# Patient Record
Sex: FEMALE | Race: WHITE | NOT HISPANIC OR LATINO | URBAN - METROPOLITAN AREA
[De-identification: names, ages, dates, MRNs, and addresses within clinical notes are randomized per-mention and may not be internally consistent; named-entity substitution may affect disease eponyms.]

---

## 2017-03-20 ENCOUNTER — IMPORTED ENCOUNTER (OUTPATIENT)
Dept: URBAN - METROPOLITAN AREA CLINIC 1 | Facility: CLINIC | Age: 82
End: 2017-03-20

## 2017-03-20 PROBLEM — H02.834: Noted: 2017-03-20

## 2017-03-20 PROBLEM — H02.831: Noted: 2017-03-20

## 2017-03-20 PROBLEM — H25.813: Noted: 2017-03-20

## 2017-03-20 PROBLEM — H16.143: Noted: 2017-03-20

## 2017-03-20 PROBLEM — H40.1112: Noted: 2017-03-20

## 2017-03-20 PROBLEM — H40.1121: Noted: 2017-03-20

## 2017-03-20 PROBLEM — H04.123: Noted: 2017-03-20

## 2017-03-20 PROCEDURE — 92083 EXTENDED VISUAL FIELD XM: CPT

## 2017-03-20 PROCEDURE — 92014 COMPRE OPH EXAM EST PT 1/>: CPT

## 2017-03-20 PROCEDURE — 92015 DETERMINE REFRACTIVE STATE: CPT

## 2017-03-20 NOTE — PATIENT DISCUSSION
1.  Cataract OU:  Visually Significant secondary to glare discussed the risks benefits alternatives and limitations of cataract surgery. The patient stated a full understanding and a desire to proceed with the procedure. The patient will need to return for preop appointment with cataract measurements and to have any additional questions answered and start pre-operative eye drops as directed. Phaco PCL OS then OD. Otherwise follow-up in 6 months for a dfe/OCT/glare in 6 months2. 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 Suoerior arcuate noted on HVF OD. Normal HVF OS. Patient to continue with Lumigan OU QHS. Patient advised to be compliant with gtts. Condition was discussed with patient and patient understands. Will continue to monitor patient for any progression in condition. Patient was advised to call us with any problems questions or concerns. 3.  RASTA w/ PEK OU- Stable. The continuation of artificial tears were recommended. 4.  Dermatochalasis OU UL's- Follow with no intervention at this time. 5. Return for an appointment for birgit H&P with Dr. Rob Dunlap.

## 2017-05-30 ENCOUNTER — IMPORTED ENCOUNTER (OUTPATIENT)
Dept: URBAN - METROPOLITAN AREA CLINIC 1 | Facility: CLINIC | Age: 82
End: 2017-05-30

## 2017-05-30 PROBLEM — H25.813: Noted: 2017-05-30

## 2017-05-30 PROCEDURE — 92136 OPHTHALMIC BIOMETRY: CPT

## 2017-05-30 NOTE — PATIENT DISCUSSION
1. Cataract OU:  Visually Significant secondary to glare discussed the risks benefits alternatives and limitations of cataract surgery. The patient stated a full understanding and a desire to proceed with the procedure. Pt understands they will need glasses post-op to achieve their best vision at distance and near. Pt states she only wants what insurance covers. Phaco PCL OS then OD. Standard lens standard technique.

## 2017-06-07 ENCOUNTER — IMPORTED ENCOUNTER (OUTPATIENT)
Dept: URBAN - METROPOLITAN AREA CLINIC 1 | Facility: CLINIC | Age: 82
End: 2017-06-07

## 2017-06-08 ENCOUNTER — IMPORTED ENCOUNTER (OUTPATIENT)
Dept: URBAN - METROPOLITAN AREA CLINIC 1 | Facility: CLINIC | Age: 82
End: 2017-06-08

## 2017-06-08 PROBLEM — Z96.1: Noted: 2017-06-08

## 2017-06-08 PROCEDURE — 99024 POSTOP FOLLOW-UP VISIT: CPT

## 2017-06-08 NOTE — PATIENT DISCUSSION
POD#1 CE/IOL OS (Standard)  doing well. Continue all 3 gtts as prescribed and until gone. Use Lotemax BID OS Prolensa Qdaily OS Ocuflox TID OS Use Lumigan QHS OU Post op Warnings Reiterated RTC as scheduled

## 2017-06-16 ENCOUNTER — IMPORTED ENCOUNTER (OUTPATIENT)
Dept: URBAN - METROPOLITAN AREA CLINIC 1 | Facility: CLINIC | Age: 82
End: 2017-06-16

## 2017-06-16 PROBLEM — Z96.1: Noted: 2017-06-16

## 2017-06-16 PROBLEM — H25.811: Noted: 2017-06-16

## 2017-06-16 PROCEDURE — 92136 OPHTHALMIC BIOMETRY: CPT

## 2017-06-16 NOTE — PATIENT DISCUSSION
1.  Cataract OD: Visually Significant secondary to glare discussed the risks benefits alternatives and limitations of cataract surgery. The patient stated a full understanding and a desire to proceed with the procedure. The patient will need to start pre-operative eye drops as directed. Phaco PCL OD Pt understands they will need glasses post-op to achieve their best corrected vision. 2.  POW#1  CE/IOL Standard OS doing well. Discontinue OcufloxContinue Lotemax/Durezol/Prednisolone BID until gone. Continue Prolensa/Ilevro/Acular QD until gone. 3.   Return for sx OD as scheduled w/ [facility]

## 2017-06-28 ENCOUNTER — IMPORTED ENCOUNTER (OUTPATIENT)
Dept: URBAN - METROPOLITAN AREA CLINIC 1 | Facility: CLINIC | Age: 82
End: 2017-06-28

## 2017-06-29 ENCOUNTER — IMPORTED ENCOUNTER (OUTPATIENT)
Dept: URBAN - METROPOLITAN AREA CLINIC 1 | Facility: CLINIC | Age: 82
End: 2017-06-29

## 2017-06-29 PROBLEM — Z96.1: Noted: 2017-06-29

## 2017-06-29 PROCEDURE — 99024 POSTOP FOLLOW-UP VISIT: CPT

## 2017-06-29 NOTE — PATIENT DISCUSSION
1. POD#1 CE/IOL OD (Standard)  doing well. Continue all 3 gtts as prescribed and until gone. Use Lotemax BID OD Prolensa Qdaily OD Ocuflox TID OD 2. POW#1  CE/IOL OS (Standard) doing well.   Use Lotemax BID OS till out Use Prolensa Qdaily OS till out F/u as scheduled

## 2017-07-21 ENCOUNTER — IMPORTED ENCOUNTER (OUTPATIENT)
Dept: URBAN - METROPOLITAN AREA CLINIC 1 | Facility: CLINIC | Age: 82
End: 2017-07-21

## 2017-07-21 PROBLEM — Z96.1: Noted: 2017-07-21

## 2017-07-21 PROCEDURE — 99024 POSTOP FOLLOW-UP VISIT: CPT

## 2017-07-21 NOTE — PATIENT DISCUSSION
POM#1 CE/IOL Standard OU doing well. Continue Lotemax BID until gone. Continue Prolensa QD until gone. Continue Lumigan OU QHS. Return for an appointment for a 30/OCT in 3-4 months with Dr. Santiago Lama.

## 2017-11-27 ENCOUNTER — IMPORTED ENCOUNTER (OUTPATIENT)
Dept: URBAN - METROPOLITAN AREA CLINIC 1 | Facility: CLINIC | Age: 82
End: 2017-11-27

## 2017-11-27 PROBLEM — Z96.1: Noted: 2017-11-27

## 2017-11-27 PROBLEM — H40.1112: Noted: 2017-11-27

## 2017-11-27 PROBLEM — H04.123: Noted: 2017-11-27

## 2017-11-27 PROBLEM — H40.1121: Noted: 2017-11-27

## 2017-11-27 PROBLEM — H16.143: Noted: 2017-11-27

## 2017-11-27 PROBLEM — H43.811: Noted: 2017-11-27

## 2017-11-27 PROCEDURE — 92014 COMPRE OPH EXAM EST PT 1/>: CPT

## 2017-11-27 PROCEDURE — 92133 CPTRZD OPH DX IMG PST SGM ON: CPT

## 2017-11-27 NOTE — PATIENT DISCUSSION
1. COAG OU-- Mod OD Mild OS (0.8/0.75)- OCT show no progression OU. Stable IOP OU on Lumigan OU QHS. CPM. H/o PI's OU; Compliance with meds. 2.  RASTA w/ increased PEK OU- Increase ATs to TID OU Routinely. 3.  Pseudophakia OU - (Standard OU) 4. PVD w/o Tear OD - RD precautions. Letter to PCP Return for an appointment in 6 mo 10 VF 24-2 OU with Dr. Yuniel Grey.

## 2018-06-04 ENCOUNTER — IMPORTED ENCOUNTER (OUTPATIENT)
Dept: URBAN - METROPOLITAN AREA CLINIC 1 | Facility: CLINIC | Age: 83
End: 2018-06-04

## 2018-06-04 PROBLEM — H40.1121: Noted: 2018-06-04

## 2018-06-04 PROBLEM — H40.1112: Noted: 2018-06-04

## 2018-06-04 PROBLEM — H02.423: Noted: 2018-06-04

## 2018-06-04 PROBLEM — H16.143: Noted: 2018-06-04

## 2018-06-04 PROBLEM — H04.123: Noted: 2018-06-04

## 2018-06-04 PROBLEM — Z96.1: Noted: 2018-06-04

## 2018-06-04 PROCEDURE — 92012 INTRM OPH EXAM EST PATIENT: CPT

## 2018-06-04 NOTE — PATIENT DISCUSSION
Mild Open Angle Glaucoma OS -Patient to continue with current gtt regimen. Patient advised to be compliant with gtts. Condition was discussed with patient and patient understands. Will continue to monitor patient for any progression in condition. Patient was advised to call us with any problems questions or concerns.

## 2018-06-04 NOTE — PATIENT DISCUSSION
1.  Moderate Open Angle Glaucoma OD (0.8)- Stable IOP. HVF defect OD w/ slight progression. Patient to continue with Lumigan OU QHS. Patient advised to be compliant with gtts. Condition was discussed with patient and patient understands. Will continue to monitor patient for any progression in condition. Patient was advised to call us with any problems questions or concerns. 2.  Mild Open Angle Glaucoma OS (0.75)- Stable IOP. Normal HVF OS. Patient to continue with Lumigan OU QHS. Patient advised to be compliant with gtts. Condition was discussed with patient and patient understands. Will continue to monitor patient for any progression in condition. Patient was advised to call us with any problems questions or concerns. 3.  RASTA w/ PEK OU- Much improved. The continuation of artificial tears were recommended. 4.  Myogenic Ptosis OU- Will continue to observe at this time. 5.  Pseudophakia OU- Doing well. 6.  Return for an appointment for a 30/OCT in 6 months with Dr. Omero Grey.

## 2018-12-03 ENCOUNTER — IMPORTED ENCOUNTER (OUTPATIENT)
Dept: URBAN - METROPOLITAN AREA CLINIC 1 | Facility: CLINIC | Age: 83
End: 2018-12-03

## 2018-12-03 PROBLEM — H04.123: Noted: 2018-12-03

## 2018-12-03 PROBLEM — H40.1112: Noted: 2018-12-03

## 2018-12-03 PROBLEM — H40.1121: Noted: 2018-12-03

## 2018-12-03 PROBLEM — H16.143: Noted: 2018-12-03

## 2018-12-03 PROCEDURE — 92014 COMPRE OPH EXAM EST PT 1/>: CPT

## 2018-12-03 PROCEDURE — 92133 CPTRZD OPH DX IMG PST SGM ON: CPT

## 2018-12-03 NOTE — PATIENT DISCUSSION
1.  Moderate Open Angle Glaucoma OD (CD 0.8) OCT shows no progression OU. IOP stable OU. Cont Lumigan QHS OU. Compliance with meds. 2.  Mild Open Angle Glaucoma OS (CD 0.75) OCT shows no progression OU. IOP stable OU. Cont Lumigan QHS OU. Compliance with meds. 3.  RASTA w/ PEK OU- Increase ATs TID OU Routinely. (Samples of ATs x2 given) 3. Pseudophakia OU - (Standard OU) 4. PVD w/o Tear OD - RD precautions. Letter to Felicia Buchanan for an appointment in 6 mo 10 VF 24-2 OU with Dr. Nicole Ozuna.

## 2019-06-03 ENCOUNTER — IMPORTED ENCOUNTER (OUTPATIENT)
Dept: URBAN - METROPOLITAN AREA CLINIC 1 | Facility: CLINIC | Age: 84
End: 2019-06-03

## 2019-06-03 PROBLEM — H40.1121: Noted: 2019-06-03

## 2019-06-03 PROBLEM — H40.1112: Noted: 2019-06-03

## 2019-06-03 PROCEDURE — 92083 EXTENDED VISUAL FIELD XM: CPT

## 2019-06-03 PROCEDURE — 92012 INTRM OPH EXAM EST PATIENT: CPT

## 2019-06-03 NOTE — PATIENT DISCUSSION
1.  Moderate Open Angle Glaucoma OD (CD 0.8) VF shows no progression OU. IOP stable OU. Cont Lumigan QHS OU. Compliance with meds. 2.  Mild Open Angle Glaucoma OS (CD 0.75) VF shows no progression OU. IOP stable OU. Cont Lumigan QHS OU. Compliance with meds. 3.  RASTA w/ PEK OU- Increase ATs TID OU Routinely. (Samples of ATs x2 given) 3. Pseudophakia OU - (Standard OU) 4. PVD w/o Tear OD - RD precautions. Return for an appointment in 6 mo 30 OCT with Dr. Crow Monet.

## 2019-09-19 NOTE — PATIENT DISCUSSION
PLAN: CE/IOL OS THEN OD, goal trudy ou, p.o with [facility]. Candidate for all lens options. Needs van. WANTS BASIC ONLY OU.  Understands she will most likely need glasses for all ranges. No imprimis ou.

## 2019-12-09 ENCOUNTER — IMPORTED ENCOUNTER (OUTPATIENT)
Dept: URBAN - METROPOLITAN AREA CLINIC 1 | Facility: CLINIC | Age: 84
End: 2019-12-09

## 2019-12-09 PROBLEM — H43.811: Noted: 2019-12-09

## 2019-12-09 PROBLEM — H04.123: Noted: 2019-12-09

## 2019-12-09 PROBLEM — H02.834: Noted: 2019-12-09

## 2019-12-09 PROBLEM — H40.1111: Noted: 2019-12-09

## 2019-12-09 PROBLEM — H40.1121: Noted: 2019-12-09

## 2019-12-09 PROBLEM — H02.831: Noted: 2019-12-09

## 2019-12-09 PROBLEM — H16.143: Noted: 2019-12-09

## 2019-12-09 PROBLEM — H40.1112: Noted: 2019-12-09

## 2019-12-09 PROBLEM — Z96.1: Noted: 2019-12-09

## 2019-12-09 PROCEDURE — 92133 CPTRZD OPH DX IMG PST SGM ON: CPT

## 2019-12-09 PROCEDURE — 92014 COMPRE OPH EXAM EST PT 1/>: CPT

## 2019-12-09 NOTE — PATIENT DISCUSSION
1.  Moderate Open Angle Glaucoma OD (CD 0.8) OCT today showing slight progression however signal strength relatively poor. IOP stable OU. Cont Lumigan QHS OU. Compliance with meds. **Add gtt with any future progression2. Mild Open Angle Glaucoma OS (CD 0.75) OCT today showing slight progression however signal strength relatively poor. IOP stable OU. Cont Lumigan QHS OU. Compliance with meds. **Add gtt with any future progression3. RASTA w/ PEK OU -- Cont ATs TID OU Routinely. (Samples of ATs x2 given) 4. Pseudophakia OU - (Standard OU)  5. PVD w/o Tear OD - RD precautions. 6.  Dermatochalasis OU UL's  - Follow with no intervention at this time. 7. S/p PI 2014. Return for an appointment in 6 months for a 10/VF with Dr. Zuly Tate.

## 2019-12-10 NOTE — PATIENT DISCUSSION
ready for IOL. [FreeTextEntry1] : Poor sleep from stress\par ativan 0.5 1 or 2 tab night\par full blood\par stress management

## 2020-06-12 ENCOUNTER — IMPORTED ENCOUNTER (OUTPATIENT)
Dept: URBAN - METROPOLITAN AREA CLINIC 1 | Facility: CLINIC | Age: 85
End: 2020-06-12

## 2020-06-12 PROBLEM — H40.1121: Noted: 2020-06-12

## 2020-06-12 PROBLEM — H40.1112: Noted: 2020-06-12

## 2020-06-12 PROCEDURE — 99213 OFFICE O/P EST LOW 20 MIN: CPT

## 2020-06-12 NOTE — PATIENT DISCUSSION
RASTA w/ PEK OU - Cont ATs TID OU Routinely. 4. Pseudophakia OU - (Standard OU)  5.  Dermatochalasis OU UL's  - Follow with no intervention at this time. 6. H/o PVD ODReturn for an appointment in [place] [address] 30/OCT with Dr. Machelle Salazar.

## 2020-06-12 NOTE — PATIENT DISCUSSION
1.  Moderate Open Angle Glaucoma OD/Mild OS (CD 0.80/0.75) - IOP stable cont Latanoprost QHS OU. **Add gtts with future progression. S/p PI OU (2014). Patient advised to be compliant with gtts. Condition was discussed with patient and patient understands. Will continue to monitor patient for any progression in condition. Patient was advised to call us with any problems questions or concerns. 2.  RASTA w/ PEK OU - Cont ATs TID OU Routinely. 3. Pseudophakia OU - (Standard OU)  4.  Dermatochalasis OU UL's  - Follow with no intervention at this time. 5. H/o PVD ODReturn for an appointment in November 30/OCT with Dr. Zuly Tate.

## 2020-07-16 NOTE — PATIENT DISCUSSION
photo today-disc with pt that we must follow this/refer to a retinal specialist for treatment, no visual issue. History of higher BP.

## 2021-01-04 ENCOUNTER — IMPORTED ENCOUNTER (OUTPATIENT)
Dept: URBAN - METROPOLITAN AREA CLINIC 1 | Facility: CLINIC | Age: 86
End: 2021-01-04

## 2021-01-04 PROBLEM — H16.143: Noted: 2021-01-04

## 2021-01-04 PROBLEM — H40.1112: Noted: 2021-01-04

## 2021-01-04 PROBLEM — H40.1121: Noted: 2021-01-04

## 2021-01-04 PROBLEM — H04.123: Noted: 2021-01-04

## 2021-01-04 PROCEDURE — 92014 COMPRE OPH EXAM EST PT 1/>: CPT

## 2021-01-04 PROCEDURE — 92133 CPTRZD OPH DX IMG PST SGM ON: CPT

## 2021-01-04 NOTE — PATIENT DISCUSSION
1.  Moderate Open Angle Glaucoma OD/Mild OS (CD 0.80/0.75) - No progression by OCT OU. IOP stable cont Latanoprost QHS OU. **Add gtts with future progression. S/p PI OU (2014). Patient advised to be compliant with gtts. Condition was discussed with patient and patient understands. Will continue to monitor patient for any progression in condition. Patient was advised to call us with any problems questions or concerns. 2.  RASTA w/ PEK OU -- Recommend ATs TID OU Routinely3. Pseudophakia OU - (Standard OU)  4. PVD w/o Tear OD - Stable. RD precautions. 5.  Dermatochalasis OU UL's  - Follow with no intervention at this time. Patient deferred Manifest Rx today. Return for an appointment in 6 months 10 with Dr. Milly Shaw.

## 2021-04-21 NOTE — PATIENT DISCUSSION
The patient feels that the cataract is significantly impacting daily activities and has elected cataract surgery. The risks, benefits, and alternatives to surgery were discussed. The patient elects to proceed with surgery. Labs/Imaging Studies/Medications

## 2021-07-12 ENCOUNTER — IMPORTED ENCOUNTER (OUTPATIENT)
Dept: URBAN - METROPOLITAN AREA CLINIC 1 | Facility: CLINIC | Age: 86
End: 2021-07-12

## 2021-07-12 PROBLEM — H40.1121: Noted: 2021-07-12

## 2021-07-12 PROBLEM — H40.1112: Noted: 2021-07-12

## 2021-07-12 PROBLEM — H04.123: Noted: 2021-07-12

## 2021-07-12 PROBLEM — H16.143: Noted: 2021-07-12

## 2021-07-12 PROCEDURE — 92083 EXTENDED VISUAL FIELD XM: CPT

## 2021-07-12 PROCEDURE — 99213 OFFICE O/P EST LOW 20 MIN: CPT

## 2021-07-12 NOTE — PATIENT DISCUSSION
1.  Moderate Open Angle Glaucoma OD/ Mild OS - VF stable. Patient to continue with current gtt regimen(Latanoprost OU hs). Patient advised to be compliant with gtts. Condition was discussed with patient and patient understands. Will continue to monitor patient for any progression in condition. Patient was advised to call us with any problems questions or concerns. 2.  RASTA w/ PEK OU-The use/continuation of artificial tears were recommended. 3.  Return for an appointment in 6 months for 30 OCT with Dr. Yue Gant.

## 2021-11-15 NOTE — PROCEDURE NOTE: SURGICAL
"<span style=""font-weight:bold;""></span><span style=""font-weight:bold;"">MR #:&nbsp;</span>&nbsp;[id number]<br /><br /><span style=""font-weight:bold;"">PREOPERATIVE DIAGNOSIS:</span>&nbsp;Dermatochalasis upper lids<br /><br /><span style=""font-weight:bold;"">POSTOPERATIVE DIAGNOSIS:</span>&nbsp;Same<br /><br /><span style=""font-weight:bold;"">OPERATIVE PROCEDURE:</span>&nbsp; Upper lid blepharoplasty both eyes<br /><br /><span style=""font-weight:bold;"">ANESTHESIA: &nbsp;</span>&nbsp;&nbsp;&nbsp; Local MAC<br /><br /><span style=""font-weight:bold;"">ESTIMATED BLOOD LOSS:</span>&nbsp;&nbsp;Minimal

## 2021-11-22 NOTE — PATIENT DISCUSSION
One week post op: great curve and shape, no infection, healing well, sutures intact and removed, use erythromycin TID x 1 weeks , then QHS to upper eyelids x 7-10 days. Wear sunglasses and hat while outdoors. Avoid sun exposure. No restrictions in 5 days.

## 2022-01-24 ENCOUNTER — IMPORTED ENCOUNTER (OUTPATIENT)
Dept: URBAN - METROPOLITAN AREA CLINIC 1 | Facility: CLINIC | Age: 87
End: 2022-01-24

## 2022-01-24 PROBLEM — H04.123: Noted: 2022-01-24

## 2022-01-24 PROBLEM — Z96.1: Noted: 2022-01-24

## 2022-01-24 PROBLEM — H40.1121: Noted: 2022-01-24

## 2022-01-24 PROBLEM — H40.1112: Noted: 2022-01-24

## 2022-01-24 PROBLEM — H16.143: Noted: 2022-01-24

## 2022-01-24 PROBLEM — H43.811: Noted: 2022-01-24

## 2022-01-24 PROCEDURE — 99214 OFFICE O/P EST MOD 30 MIN: CPT

## 2022-01-24 PROCEDURE — 92133 CPTRZD OPH DX IMG PST SGM ON: CPT

## 2022-01-24 NOTE — PATIENT DISCUSSION
1.  Moderate Open Angle Glaucoma OD/Mild OS -- (CD 0.80/0.75) No progression by OCT OU. IOP stable cont Latanoprost QHS OU. **Add gtts with future progression. S/p PI OU (2014). Patient advised to be compliant with gtts. Condition was discussed with patient and patient understands. Will continue to monitor patient for any progression in condition. Patient was advised to call us with any problems questions or concerns. 2.  RASTA w/ PEK OU -- Recommend ATs TID OU Routinely. *urged compliance. 3.  Dermatochalasis OU UL's -- Follow with no intervention at this time. 4. Pseudophakia OU (Standard OU)  5. PVD w/o Tear OD -- Stable. RD precautions. Patient deferred Manifest Rx today. Return for an appointment in 6 months 10/VF with Dr. Claudeen Cobble.

## 2022-04-02 ASSESSMENT — VISUAL ACUITY
OS_CC: 20/50
OS_CC: 20/30
OD_CC: 20/60
OS_CC: 20/25
OD_CC: 20/30-1
OD_CC: 20/25
OS_PH: SC 20/30
OS_CC: 20/60
OS_GLARE: 20/50
OD_SC: 20/25
OD_CC: 20/40
OD_CC: 20/25
OD_SC: 20/20-1
OD_CC: 20/60
OS_CC: 20/30
OD_SC: 20/50-1
OS_CC: 20/30
OD_CC: 20/40
OD_CC: 20/25-2
OS_CC: 20/25-1
OD_CC: J1
OD_CC: 20/30+2
OS_SC: 20/30
OS_SC: 20/20
OS_CC: 20/30
OS_CC: 20/30
OS_CC: 20/50
OD_CC: 20/20
OD_CC: 20/25-2
OS_CC: 20/20-1
OD_CC: 20/30
OD_CC: 20/25-1
OS_SC: 20/25
OS_CC: 20/25
OS_GLARE: 20/50
OD_GLARE: 20/50
OS_CC: 20/40
OD_GLARE: 20/50
OS_CC: J1
OS_CC: 20/25
OS_CC: 20/25

## 2022-04-02 ASSESSMENT — TONOMETRY
OS_IOP_MMHG: 20
OD_IOP_MMHG: 15
OD_IOP_MMHG: 17
OS_IOP_MMHG: 15
OD_IOP_MMHG: 14
OS_IOP_MMHG: 17
OS_IOP_MMHG: 18
OD_IOP_MMHG: 14
OS_IOP_MMHG: 15
OS_IOP_MMHG: 17
OD_IOP_MMHG: 17
OD_IOP_MMHG: 16
OD_IOP_MMHG: 15
OS_IOP_MMHG: 17
OS_IOP_MMHG: 18
OD_IOP_MMHG: 15
OD_IOP_MMHG: 17
OS_IOP_MMHG: 19
OD_IOP_MMHG: 18
OD_IOP_MMHG: 17
OD_IOP_MMHG: 16
OD_IOP_MMHG: 20
OD_IOP_MMHG: 18
OS_IOP_MMHG: 15
OS_IOP_MMHG: 16
OS_IOP_MMHG: 15
OD_IOP_MMHG: 18
OS_IOP_MMHG: 15

## 2022-04-02 ASSESSMENT — KERATOMETRY
OD_AXISANGLE2_DEGREES: 132
OS_AXISANGLE2_DEGREES: 165
OS_K2POWER_DIOPTERS: 46.50
OS_K2POWER_DIOPTERS: 46.50
OS_AXISANGLE2_DEGREES: 096
OD_AXISANGLE_DEGREES: 042
OS_K1POWER_DIOPTERS: 45.25
OS_K1POWER_DIOPTERS: 45.75
OD_AXISANGLE_DEGREES: 148
OD_K1POWER_DIOPTERS: 45.75
OD_K2POWER_DIOPTERS: 46.50
OD_AXISANGLE2_DEGREES: 058
OS_AXISANGLE_DEGREES: 006
OD_K1POWER_DIOPTERS: 46.50
OD_K2POWER_DIOPTERS: 45.00
OS_AXISANGLE_DEGREES: 075

## 2022-09-09 ENCOUNTER — FOLLOW UP (OUTPATIENT)
Dept: URBAN - METROPOLITAN AREA CLINIC 1 | Facility: CLINIC | Age: 87
End: 2022-09-09

## 2022-09-09 DIAGNOSIS — H40.1121: ICD-10-CM

## 2022-09-09 DIAGNOSIS — H40.1112: ICD-10-CM

## 2022-09-09 PROCEDURE — 99213 OFFICE O/P EST LOW 20 MIN: CPT

## 2022-09-09 PROCEDURE — 92083 EXTENDED VISUAL FIELD XM: CPT

## 2022-09-09 ASSESSMENT — VISUAL ACUITY
OD_SC: 20/50
OS_CC: 20/20
OS_SC: 20/30
OD_CC: 20/30

## 2022-09-09 ASSESSMENT — TONOMETRY
OS_IOP_MMHG: 14
OD_IOP_MMHG: 14

## 2022-09-09 NOTE — PATIENT DISCUSSION
(CD 0.75 OS) HVF shows early inferior arcuate OS. IOP stable. Continue Latanoprost QHS OU. **Add gtts with future progression. S/p PI OU (2014). Patient advised to be compliant with gtts. Condition was discussed with patient and patient understands. Will continue to monitor patient for any progression in condition. Patient was advised to call us with any problems questions or concerns.

## 2022-09-09 NOTE — PATIENT DISCUSSION
(CD 0.80 OD) HVF shows superior arcuate with paracentral defect with progression. IOP stable, however given progression on HVF will begin Brimonidine BID OD Only. Continue Latanoprost QHS OU. S/p PI OU (2014). Patient advised to be compliant with gtts. Condition was discussed with patient and patient understands. Will continue to monitor patient for any progression in condition. Patient was advised to call us with any problems questions or concerns.

## 2022-09-09 NOTE — PATIENT DISCUSSION
Doing well. pt feeling better; SG high of urine; have spoken to pt about elevated urobilinogen and followup; no stigmata of liver disease or hemolysis

## 2022-12-09 ENCOUNTER — FOLLOW UP (OUTPATIENT)
Dept: URBAN - METROPOLITAN AREA CLINIC 1 | Facility: CLINIC | Age: 87
End: 2022-12-09

## 2022-12-09 PROCEDURE — 99213 OFFICE O/P EST LOW 20 MIN: CPT

## 2022-12-09 ASSESSMENT — VISUAL ACUITY
OD_SC: 20/30-2
OS_SC: 20/30
OD_CC: J1+
OS_CC: J1+

## 2022-12-09 ASSESSMENT — TONOMETRY
OS_IOP_MMHG: 14
OD_IOP_MMHG: 13
OD_IOP_MMHG: 15
OS_IOP_MMHG: 15

## 2022-12-09 NOTE — PATIENT DISCUSSION
(CD 0.75 OS) IOP stable. Continue Latanoprost QHS OU. **Add gtts with future progression. S/p PI OU (2014). Patient advised to be compliant with gtts. Condition was discussed with patient and patient understands. Will continue to monitor patient for any progression in condition. Patient was advised to call us with any problems questions or concerns.

## 2022-12-09 NOTE — PATIENT DISCUSSION
(CD 0.80 OD) IOP stable. Continue Brimonidine BID OD Only. Continue Latanoprost QHS OU. S/p PI OU (2014). Patient advised to be compliant with gtts. Condition was discussed with patient and patient understands. Will continue to monitor patient for any progression in condition. Patient was advised to call us with any problems questions or concerns.

## 2023-06-13 ENCOUNTER — COMPREHENSIVE EXAM (OUTPATIENT)
Dept: URBAN - METROPOLITAN AREA CLINIC 1 | Facility: CLINIC | Age: 88
End: 2023-06-13

## 2023-06-13 DIAGNOSIS — Z96.1: ICD-10-CM

## 2023-06-13 DIAGNOSIS — H04.123: ICD-10-CM

## 2023-06-13 DIAGNOSIS — H40.1112: ICD-10-CM

## 2023-06-13 DIAGNOSIS — H43.811: ICD-10-CM

## 2023-06-13 DIAGNOSIS — H40.1121: ICD-10-CM

## 2023-06-13 DIAGNOSIS — H02.831: ICD-10-CM

## 2023-06-13 DIAGNOSIS — H16.143: ICD-10-CM

## 2023-06-13 DIAGNOSIS — H26.493: ICD-10-CM

## 2023-06-13 DIAGNOSIS — H02.834: ICD-10-CM

## 2023-06-13 PROCEDURE — 99214 OFFICE O/P EST MOD 30 MIN: CPT

## 2023-06-13 PROCEDURE — 92133 CPTRZD OPH DX IMG PST SGM ON: CPT

## 2023-06-13 ASSESSMENT — VISUAL ACUITY
OD_BAT: 20/100
OS_BAT: 20/100
OD_SC: 20/30-1
OS_CC: J1
OS_SC: 20/25
OD_CC: J1

## 2023-06-13 ASSESSMENT — TONOMETRY
OS_IOP_MMHG: 14
OD_IOP_MMHG: 14

## 2024-02-26 ENCOUNTER — COMPREHENSIVE EXAM (OUTPATIENT)
Dept: URBAN - METROPOLITAN AREA CLINIC 1 | Facility: CLINIC | Age: 89
End: 2024-02-26

## 2024-02-26 DIAGNOSIS — H40.1112: ICD-10-CM

## 2024-02-26 DIAGNOSIS — H04.123: ICD-10-CM

## 2024-02-26 DIAGNOSIS — H43.811: ICD-10-CM

## 2024-02-26 DIAGNOSIS — H02.831: ICD-10-CM

## 2024-02-26 DIAGNOSIS — H02.834: ICD-10-CM

## 2024-02-26 DIAGNOSIS — H26.493: ICD-10-CM

## 2024-02-26 DIAGNOSIS — H40.1121: ICD-10-CM

## 2024-02-26 DIAGNOSIS — H16.143: ICD-10-CM

## 2024-02-26 PROCEDURE — 99214 OFFICE O/P EST MOD 30 MIN: CPT

## 2024-02-26 PROCEDURE — 92015 DETERMINE REFRACTIVE STATE: CPT

## 2024-02-26 ASSESSMENT — VISUAL ACUITY
OS_BAT: 20/100
OD_BAT: 20/100
OD_SC: 20/30+2
OS_SC: 20/25-2

## 2024-02-26 ASSESSMENT — TONOMETRY
OD_IOP_MMHG: 15
OS_IOP_MMHG: 15

## 2024-04-19 ENCOUNTER — CLINIC PROCEDURE ONLY (OUTPATIENT)
Dept: URBAN - METROPOLITAN AREA CLINIC 1 | Facility: CLINIC | Age: 89
End: 2024-04-19

## 2024-04-19 DIAGNOSIS — Z96.1: ICD-10-CM

## 2024-04-19 DIAGNOSIS — H26.493: ICD-10-CM

## 2024-04-19 PROCEDURE — 66821 AFTER CATARACT LASER SURGERY: CPT

## 2024-05-03 ENCOUNTER — CLINIC PROCEDURE ONLY (OUTPATIENT)
Dept: URBAN - METROPOLITAN AREA CLINIC 1 | Facility: CLINIC | Age: 89
End: 2024-05-03

## 2024-05-03 DIAGNOSIS — Z96.1: ICD-10-CM

## 2024-05-03 DIAGNOSIS — H26.492: ICD-10-CM

## 2024-05-03 PROCEDURE — 66821 AFTER CATARACT LASER SURGERY: CPT | Mod: 79,LT

## 2024-05-28 ENCOUNTER — POST-OP (OUTPATIENT)
Dept: URBAN - METROPOLITAN AREA CLINIC 1 | Facility: CLINIC | Age: 89
End: 2024-05-28

## 2024-05-28 DIAGNOSIS — Z96.1: ICD-10-CM

## 2024-05-28 DIAGNOSIS — Z98.890: ICD-10-CM

## 2024-05-28 ASSESSMENT — VISUAL ACUITY
OD_SC: 20/20-2
OS_SC: 20/25
OU_CC: 20/25

## 2024-05-28 ASSESSMENT — TONOMETRY
OD_IOP_MMHG: 15
OS_IOP_MMHG: 15

## 2024-12-09 ENCOUNTER — FOLLOW UP (OUTPATIENT)
Age: 89
End: 2024-12-09

## 2024-12-09 DIAGNOSIS — H40.1112: ICD-10-CM

## 2024-12-09 DIAGNOSIS — H40.1121: ICD-10-CM

## 2024-12-09 PROCEDURE — 99213 OFFICE O/P EST LOW 20 MIN: CPT

## 2024-12-09 PROCEDURE — 92133 CPTRZD OPH DX IMG PST SGM ON: CPT

## 2025-06-06 ENCOUNTER — COMPREHENSIVE EXAM (OUTPATIENT)
Age: OVER 89
End: 2025-06-06

## 2025-06-06 DIAGNOSIS — H04.123: ICD-10-CM

## 2025-06-06 DIAGNOSIS — Z96.1: ICD-10-CM

## 2025-06-06 DIAGNOSIS — H16.143: ICD-10-CM

## 2025-06-06 DIAGNOSIS — H40.1121: ICD-10-CM

## 2025-06-06 DIAGNOSIS — H40.1112: ICD-10-CM

## 2025-06-06 PROCEDURE — 92083 EXTENDED VISUAL FIELD XM: CPT

## 2025-06-06 PROCEDURE — 99214 OFFICE O/P EST MOD 30 MIN: CPT

## 2025-06-06 PROCEDURE — 92015 DETERMINE REFRACTIVE STATE: CPT
